# Patient Record
Sex: MALE
[De-identification: names, ages, dates, MRNs, and addresses within clinical notes are randomized per-mention and may not be internally consistent; named-entity substitution may affect disease eponyms.]

---

## 2021-02-28 ENCOUNTER — NURSE TRIAGE (OUTPATIENT)
Dept: OTHER | Facility: CLINIC | Age: 61
End: 2021-02-28

## 2021-02-28 NOTE — TELEPHONE ENCOUNTER
Caller needed address for [facility].  Number shared for [facility] representative:  [phone] and hours of service provided. Reason for Disposition   General information question, no triage required and triager able to answer question    Answer Assessment - Initial Assessment Questions  1. REASON FOR CALL or QUESTION: \"What is your reason for calling today? \" or \"How can I best help you? \" or \"What question do you have that I can help answer? \"      Needed address for [facility] to set up his covid test.    Protocols used: INFORMATION ONLY CALL - NO TRIAGE-ADULT-